# Patient Record
Sex: MALE | URBAN - METROPOLITAN AREA
[De-identification: names, ages, dates, MRNs, and addresses within clinical notes are randomized per-mention and may not be internally consistent; named-entity substitution may affect disease eponyms.]

---

## 2018-04-25 ENCOUNTER — DOCUMENTATION ONLY (OUTPATIENT)
Dept: VASCULAR SURGERY | Age: 83
End: 2018-04-25

## 2018-04-25 NOTE — PROGRESS NOTES
Our office received paperwork  from Dr. Soco Felipe office, pt was being referred to us for possible surgery. I cld the pt to schedule IOV appt, wife answered and said pt not available so I spoke with her. Wife says that we can disregard the paperwork because they have already scheduled with someone closer to them. I called Dr. Soco Felipe office, spoke to Nini to inform her that pt's wife did not want an appointment.